# Patient Record
Sex: MALE | URBAN - METROPOLITAN AREA
[De-identification: names, ages, dates, MRNs, and addresses within clinical notes are randomized per-mention and may not be internally consistent; named-entity substitution may affect disease eponyms.]

---

## 2020-01-03 ENCOUNTER — APPOINTMENT (RX ONLY)
Dept: URBAN - METROPOLITAN AREA CLINIC 110 | Facility: CLINIC | Age: 35
Setting detail: DERMATOLOGY
End: 2020-01-03

## 2020-01-30 ENCOUNTER — APPOINTMENT (RX ONLY)
Dept: URBAN - METROPOLITAN AREA CLINIC 110 | Facility: CLINIC | Age: 35
Setting detail: DERMATOLOGY
End: 2020-01-30

## 2020-01-30 DIAGNOSIS — D22 MELANOCYTIC NEVI: ICD-10-CM

## 2020-01-30 PROBLEM — D22.9 MELANOCYTIC NEVI, UNSPECIFIED: Status: ACTIVE | Noted: 2020-01-30

## 2020-01-30 PROCEDURE — 11302 SHAVE SKIN LESION 1.1-2.0 CM: CPT

## 2020-01-30 PROCEDURE — ? SHAVE REMOVAL

## 2020-01-30 PROCEDURE — ? DIAGNOSIS COMMENT

## 2020-01-30 ASSESSMENT — LOCATION DETAILED DESCRIPTION DERM: LOCATION DETAILED: PILONIDAL AREA (MIDLINE)

## 2020-01-30 ASSESSMENT — LOCATION SIMPLE DESCRIPTION DERM: LOCATION SIMPLE: PILONIDAL AREA (MIDLINE)

## 2020-01-30 ASSESSMENT — LOCATION ZONE DERM: LOCATION ZONE: TRUNK

## 2020-01-30 NOTE — PROCEDURE: DIAGNOSIS COMMENT
Comment: Pt complains of intense itching, irritation, and bleeding 2-3 times a day.
Detail Level: Simple

## 2020-01-30 NOTE — PROCEDURE: SHAVE REMOVAL
Biopsy Method: Dermablade
Medical Necessity Information: It is in your best interest to select a reason for this procedure from the list below. All of these items fulfill various CMS LCD requirements except the new and changing color options.
Bill For Surgical Tray: no
Anesthesia Type: 1% lidocaine with epinephrine
Wound Care: Petrolatum
Detail Level: Detailed
Size Of Lesion In Cm (Required): 1.8
X Size Of Lesion In Cm (Optional): 0
Billing Type: Third-Party Bill
Consent was obtained from the patient. The risks and benefits to therapy were discussed in detail. Specifically, the risks of infection, scarring, bleeding, prolonged wound healing, incomplete removal, allergy to anesthesia, nerve injury and recurrence were addressed. Prior to the procedure, the treatment site was clearly identified and confirmed by the patient. All components of Universal Protocol/PAUSE Rule completed.
Path Notes (To The Dermatopathologist): Please check margins.
Lab: [id number]
Was A Bandage Applied: Yes
Hemostasis: Drysol
Notification Instructions: Patient will be notified of biopsy results. However, patient instructed to call the office if not contacted within 2 weeks.
Medical Necessity Clause: This procedure was medically necessary because the lesion that was treated was:
Post-Care Instructions: I reviewed with the patient in detail post-care instructions. Patient is to keep the biopsy site dry overnight, and then apply bacitracin twice daily until healed. Patient may apply hydrogen peroxide soaks to remove any crusting.

## 2020-01-30 NOTE — HPI: SKIN LESION
Is This A New Presentation, Or A Follow-Up?: Skin Lesion
What Type Of Note Output Would You Prefer (Optional)?: Bullet Format
How Severe Is Your Skin Lesion?: mild
Has Your Skin Lesion Been Treated?: not been treated
Additional History: Patient would like lesion to be removed.